# Patient Record
Sex: FEMALE | Race: AMERICAN INDIAN OR ALASKA NATIVE
[De-identification: names, ages, dates, MRNs, and addresses within clinical notes are randomized per-mention and may not be internally consistent; named-entity substitution may affect disease eponyms.]

---

## 2022-09-12 ENCOUNTER — HOSPITAL ENCOUNTER (EMERGENCY)
Dept: HOSPITAL 5 - ED | Age: 42
Discharge: LEFT BEFORE DISCHARGE | End: 2022-09-12
Payer: MEDICARE

## 2022-09-12 VITALS — DIASTOLIC BLOOD PRESSURE: 57 MMHG | SYSTOLIC BLOOD PRESSURE: 151 MMHG

## 2022-09-12 DIAGNOSIS — R07.89: Primary | ICD-10-CM

## 2022-09-12 DIAGNOSIS — Z53.21: ICD-10-CM

## 2022-09-12 DIAGNOSIS — M79.18: ICD-10-CM

## 2022-09-26 ENCOUNTER — HOSPITAL ENCOUNTER (INPATIENT)
Dept: HOSPITAL 5 - ED | Age: 42
LOS: 3 days | Discharge: HOME | DRG: 871 | End: 2022-09-29
Attending: STUDENT IN AN ORGANIZED HEALTH CARE EDUCATION/TRAINING PROGRAM | Admitting: INTERNAL MEDICINE
Payer: MEDICARE

## 2022-09-26 DIAGNOSIS — I21.A1: ICD-10-CM

## 2022-09-26 DIAGNOSIS — F17.200: ICD-10-CM

## 2022-09-26 DIAGNOSIS — E87.5: ICD-10-CM

## 2022-09-26 DIAGNOSIS — Z86.73: ICD-10-CM

## 2022-09-26 DIAGNOSIS — J15.6: ICD-10-CM

## 2022-09-26 DIAGNOSIS — J96.01: ICD-10-CM

## 2022-09-26 DIAGNOSIS — I25.10: ICD-10-CM

## 2022-09-26 DIAGNOSIS — Z88.8: ICD-10-CM

## 2022-09-26 DIAGNOSIS — N18.6: ICD-10-CM

## 2022-09-26 DIAGNOSIS — Z20.822: ICD-10-CM

## 2022-09-26 DIAGNOSIS — A41.9: Primary | ICD-10-CM

## 2022-09-26 DIAGNOSIS — I12.0: ICD-10-CM

## 2022-09-26 LAB
ALBUMIN SERPL-MCNC: 4.6 G/DL (ref 3.9–5)
ALT SERPL-CCNC: 24 UNITS/L (ref 7–56)
BASOPHILS # (AUTO): 0.2 K/MM3 (ref 0–0.1)
BASOPHILS NFR BLD AUTO: 1.4 % (ref 0–1.8)
BUN SERPL-MCNC: 58 MG/DL (ref 7–17)
BUN/CREAT SERPL: 6 %
CALCIUM SERPL-MCNC: 8.6 MG/DL (ref 8.4–10.2)
EOSINOPHIL # BLD AUTO: 0 K/MM3 (ref 0–0.4)
EOSINOPHIL NFR BLD AUTO: 0.1 % (ref 0–4.3)
HCT VFR BLD CALC: 30.7 % (ref 30.3–42.9)
HDLC SERPL-MCNC: 64 MG/DL (ref 40–59)
HEMOLYSIS INDEX: 7
HGB BLD-MCNC: 9.5 GM/DL (ref 10.1–14.3)
INR PPP: 0.95 (ref 0.87–1.13)
LYMPHOCYTES # BLD AUTO: 0.5 K/MM3 (ref 1.2–5.4)
LYMPHOCYTES NFR BLD AUTO: 3.7 % (ref 13.4–35)
MCHC RBC AUTO-ENTMCNC: 31 % (ref 30–34)
MCV RBC AUTO: 99 FL (ref 79–97)
MONOCYTES # (AUTO): 0.8 K/MM3 (ref 0–0.8)
MONOCYTES % (AUTO): 6 % (ref 0–7.3)
PLATELET # BLD: 339 K/MM3 (ref 140–440)
RBC # BLD AUTO: 3.1 M/MM3 (ref 3.65–5.03)

## 2022-09-26 PROCEDURE — 85610 PROTHROMBIN TIME: CPT

## 2022-09-26 PROCEDURE — 82962 GLUCOSE BLOOD TEST: CPT

## 2022-09-26 PROCEDURE — U0003 INFECTIOUS AGENT DETECTION BY NUCLEIC ACID (DNA OR RNA); SEVERE ACUTE RESPIRATORY SYNDROME CORONAVIRUS 2 (SARS-COV-2) (CORONAVIRUS DISEASE [COVID-19]), AMPLIFIED PROBE TECHNIQUE, MAKING USE OF HIGH THROUGHPUT TECHNOLOGIES AS DESCRIBED BY CMS-2020-01-R: HCPCS

## 2022-09-26 PROCEDURE — 80061 LIPID PANEL: CPT

## 2022-09-26 PROCEDURE — 85025 COMPLETE CBC W/AUTO DIFF WBC: CPT

## 2022-09-26 PROCEDURE — 82550 ASSAY OF CK (CPK): CPT

## 2022-09-26 PROCEDURE — 96365 THER/PROPH/DIAG IV INF INIT: CPT

## 2022-09-26 PROCEDURE — 84484 ASSAY OF TROPONIN QUANT: CPT

## 2022-09-26 PROCEDURE — 99285 EMERGENCY DEPT VISIT HI MDM: CPT

## 2022-09-26 PROCEDURE — 80074 ACUTE HEPATITIS PANEL: CPT

## 2022-09-26 PROCEDURE — 84132 ASSAY OF SERUM POTASSIUM: CPT

## 2022-09-26 PROCEDURE — C8929 TTE W OR WO FOL WCON,DOPPLER: HCPCS

## 2022-09-26 PROCEDURE — 80053 COMPREHEN METABOLIC PANEL: CPT

## 2022-09-26 PROCEDURE — 85007 BL SMEAR W/DIFF WBC COUNT: CPT

## 2022-09-26 PROCEDURE — 82553 CREATINE MB FRACTION: CPT

## 2022-09-26 PROCEDURE — 93005 ELECTROCARDIOGRAM TRACING: CPT

## 2022-09-26 PROCEDURE — 93306 TTE W/DOPPLER COMPLETE: CPT

## 2022-09-26 PROCEDURE — 83690 ASSAY OF LIPASE: CPT

## 2022-09-26 PROCEDURE — 83036 HEMOGLOBIN GLYCOSYLATED A1C: CPT

## 2022-09-26 PROCEDURE — 83880 ASSAY OF NATRIURETIC PEPTIDE: CPT

## 2022-09-26 PROCEDURE — 96366 THER/PROPH/DIAG IV INF ADDON: CPT

## 2022-09-26 PROCEDURE — 82010 KETONE BODYS QUAN: CPT

## 2022-09-26 PROCEDURE — 71045 X-RAY EXAM CHEST 1 VIEW: CPT

## 2022-09-26 PROCEDURE — 83735 ASSAY OF MAGNESIUM: CPT

## 2022-09-26 PROCEDURE — 36415 COLL VENOUS BLD VENIPUNCTURE: CPT

## 2022-09-26 RX ADMIN — MORPHINE SULFATE PRN MG: 2 INJECTION, SOLUTION INTRAMUSCULAR; INTRAVENOUS at 18:50

## 2022-09-26 RX ADMIN — HEPARIN SODIUM SCH UNIT: 5000 INJECTION, SOLUTION INTRAVENOUS; SUBCUTANEOUS at 21:50

## 2022-09-26 RX ADMIN — HEPARIN SODIUM SCH UNIT: 5000 INJECTION, SOLUTION INTRAVENOUS; SUBCUTANEOUS at 15:02

## 2022-09-26 RX ADMIN — MORPHINE SULFATE PRN MG: 2 INJECTION, SOLUTION INTRAMUSCULAR; INTRAVENOUS at 15:02

## 2022-09-26 RX ADMIN — ONDANSETRON PRN MG: 2 INJECTION INTRAMUSCULAR; INTRAVENOUS at 23:39

## 2022-09-26 RX ADMIN — HEPARIN SODIUM SCH: 5000 INJECTION, SOLUTION INTRAVENOUS; SUBCUTANEOUS at 21:55

## 2022-09-26 RX ADMIN — Medication SCH ML: at 15:01

## 2022-09-26 RX ADMIN — HEPARIN SODIUM SCH: 5000 INJECTION, SOLUTION INTRAVENOUS; SUBCUTANEOUS at 15:09

## 2022-09-26 RX ADMIN — MORPHINE SULFATE PRN MG: 2 INJECTION, SOLUTION INTRAMUSCULAR; INTRAVENOUS at 21:57

## 2022-09-26 RX ADMIN — Medication SCH ML: at 21:54

## 2022-09-26 NOTE — EMERGENCY DEPARTMENT REPORT
ED Chest Pain HPI





- General


Chief Complaint: Chest Pain


Stated Complaint: CHEST PAIN


PUI?: No


Time Seen by Provider: 09/26/22 08:07


Source: patient, EMS


Mode of arrival: Stretcher


Limitations: No Limitations





- History of Present Illness


Initial Comments: 





pt is 42 years old with CRF with dialysis MWF , here today for chest pain she 

said she was released from [facility] for pneumonia 


-: Gradual, days(s)


Onset: during rest


Pain Location: substernal


Pain Radiation: back


Severity scale (0 -10): 3


Consistency: intermittent


Improves With: nothing


Worsens With: nothing





- Related Data


                                    Allergies











Allergy/AdvReac Type Severity Reaction Status Date / Time


 


flunisolide [From Nasarel] Allergy  Unknown Verified 09/12/22 14:23


 


heparin Allergy  Unknown Verified 09/12/22 14:23


 


methocarbamol [From Robaxin] Allergy  Unknown Verified 09/12/22 14:23














Heart Score





- HEART Score


History: Slightly suspicious


EKG: Normal


Age: < 45


Risk factors: 1-2 risk factors


Troponin: 1-3x normal limit


HEART Score: 2





- EKG Read Time


Time EKG Completed: 10:37


EKG Read Time: 10:37





- Critical Actions


Critical Actions: 0-3 pts:0.9-1.7%risk of adverse cardiac event.Candidate for 

discharge





ED Review of Systems


ROS: 


Stated complaint: CHEST PAIN


Other details as noted in HPI





Constitutional: denies: chills, fever


Eyes: denies: eye pain, eye discharge, vision change


ENT: denies: ear pain, throat pain


Respiratory: denies: cough, shortness of breath, wheezing


Cardiovascular: denies: chest pain, palpitations


Endocrine: no symptoms reported


Gastrointestinal: denies: abdominal pain, nausea, diarrhea


Genitourinary: denies: urgency, dysuria, discharge


Musculoskeletal: denies: back pain, joint swelling, arthralgia


Skin: denies: rash, lesions


Neurological: denies: headache, weakness, paresthesias


Psychiatric: denies: anxiety, depression


Hematological/Lymphatic: denies: easy bleeding, easy bruising





ED Past Medical Hx





- Past Medical History


Hx Hypertension: Yes


Hx CVA: Yes


Hx Heart Attack/AMI: Yes


Hx Diabetes: Yes


Hx Renal Disease: Yes





- Social History


Smoking Status: Current Every Day Smoker





ED Physical Exam





- General


Limitations: No Limitations


General appearance: alert, in no apparent distress





- Head


Head exam: Present: atraumatic, normocephalic





- Eye


Eye exam: Present: normal appearance





- ENT


ENT exam: Present: mucous membranes moist





- Neck


Neck exam: Present: normal inspection





- Respiratory


Respiratory exam: Present: normal lung sounds bilaterally.  Absent: respiratory 

distress





- Cardiovascular


Cardiovascular Exam: Present: regular rate, normal rhythm.  Absent: systolic 

murmur, diastolic murmur, rubs, gallop





- GI/Abdominal


GI/Abdominal exam: Present: soft, normal bowel sounds





- Extremities Exam


Extremities exam: Present: normal inspection





- Back Exam


Back exam: Present: normal inspection





- Neurological Exam


Neurological exam: Present: alert, oriented X3





- Psychiatric


Psychiatric exam: Present: normal affect, normal mood





- Skin


Skin exam: Present: warm, dry, intact, normal color.  Absent: rash





ED Course





                                   Vital Signs











  09/26/22 09/26/22 09/26/22





  07:39 08:12 10:04


 


Temperature 97.9 F 98.5 F 


 


Pulse Rate 111 H 104 H 103 H


 


Respiratory 16 14 27 H





Rate   


 


Blood Pressure   


 


Blood Pressure 180/90 154/62 144/76





[Left]   


 


O2 Sat by Pulse 98 98 99





Oximetry   














  09/26/22





  10:18


 


Temperature 


 


Pulse Rate 104 H


 


Respiratory 26 H





Rate 


 


Blood Pressure 155/75


 


Blood Pressure 





[Left] 


 


O2 Sat by Pulse 99





Oximetry 














ED Medical Decision Making





- Lab Data


Result diagrams: 


                                 09/26/22 08:41





                                 09/26/22 08:41





- EKG Data


-: EKG Interpreted by Me


EKG shows normal: sinus rhythm


Rate: tachycardia





- Radiology Data


Radiology results: report reviewed, image reviewed





- Medical Decision Making





work up shwoed elevated wbc , x ray showed pneumoinia abx given cultures taken 

prior, will admit for pneumonia 


Critical care attestation.: 


If time is entered above; I have spent that time in minutes in the direct care 

of this critically ill patient, excluding procedure time.








ED Disposition


Clinical Impression: 


 Pneumonia, Chest pain, ESRD (end stage renal disease) on dialysis





Disposition: 09 ADMITTED AS INPATIENT


Is pt being admited?: Yes


Does the pt Need Aspirin: No


Condition: Stable


Instructions:  Bacterial Pneumonia (ED), Nonspecific Chest Pain, Adult


Referrals: 


PRIMARY CARE,MD [Primary Care Provider] - 3-5 Days

## 2022-09-26 NOTE — XRAY REPORT
CHEST 1 VIEW 9/26/2022 7:54 AM



INDICATION / CLINICAL INFORMATION: Dyspnea.



COMPARISON: 3/19/2010



FINDINGS:



SUPPORT DEVICES: None.



HEART / MEDIASTINUM: No significant abnormality. 



LUNGS / PLEURA: Moderate bilateral pulmonary opacities have developed which are most pronounced in th
e right lower lung. No significant pleural effusion or pneumothorax. 



ADDITIONAL FINDINGS: No significant additional findings.



IMPRESSION:

New bilateral lung opacities which are most pronounced in the right lower lobe. Correlate for pneumon
ia. Atypical pneumonia is not excluded.



Signer Name: Adrián Castrejon Jr, MD 

Signed: 9/26/2022 8:56 AM

Workstation Name: [id number]

## 2022-09-27 LAB
ALBUMIN SERPL-MCNC: 4 G/DL (ref 3.9–5)
ALT SERPL-CCNC: 15 UNITS/L (ref 7–56)
ANISOCYTOSIS BLD QL SMEAR: (no result)
BAND NEUTROPHILS # (MANUAL): 0 K/MM3
BUN SERPL-MCNC: 68 MG/DL (ref 7–17)
BUN/CREAT SERPL: 7 %
CALCIUM SERPL-MCNC: 9.1 MG/DL (ref 8.4–10.2)
HCT VFR BLD CALC: 29.7 % (ref 30.3–42.9)
HEMOLYSIS INDEX: 18
HGB BLD-MCNC: 9.3 GM/DL (ref 10.1–14.3)
MCHC RBC AUTO-ENTMCNC: 31 % (ref 30–34)
MCV RBC AUTO: 97 FL (ref 79–97)
MYELOCYTES # (MANUAL): 0 K/MM3
PLATELET # BLD: 322 K/MM3 (ref 140–440)
PROMYELOCYTES # (MANUAL): 0 K/MM3
RBC # BLD AUTO: 3.08 M/MM3 (ref 3.65–5.03)
TOTAL CELLS COUNTED BLD: 100

## 2022-09-27 PROCEDURE — 5A1D70Z PERFORMANCE OF URINARY FILTRATION, INTERMITTENT, LESS THAN 6 HOURS PER DAY: ICD-10-PCS | Performed by: INTERNAL MEDICINE

## 2022-09-27 RX ADMIN — CEFTRIAXONE SODIUM SCH MLS/HR: 2 INJECTION, POWDER, FOR SOLUTION INTRAMUSCULAR; INTRAVENOUS at 22:32

## 2022-09-27 RX ADMIN — ONDANSETRON PRN MG: 2 INJECTION INTRAMUSCULAR; INTRAVENOUS at 15:50

## 2022-09-27 RX ADMIN — HEPARIN SODIUM SCH: 5000 INJECTION, SOLUTION INTRAVENOUS; SUBCUTANEOUS at 23:01

## 2022-09-27 RX ADMIN — INSULIN LISPRO SCH: 100 INJECTION, SOLUTION INTRAVENOUS; SUBCUTANEOUS at 17:00

## 2022-09-27 RX ADMIN — AZITHROMYCIN SCH MLS/HR: 500 INJECTION, POWDER, LYOPHILIZED, FOR SOLUTION INTRAVENOUS at 15:25

## 2022-09-27 RX ADMIN — PANTOPRAZOLE SODIUM SCH MG: 40 TABLET, DELAYED RELEASE ORAL at 08:13

## 2022-09-27 RX ADMIN — Medication SCH ML: at 22:04

## 2022-09-27 RX ADMIN — MORPHINE SULFATE PRN MG: 2 INJECTION, SOLUTION INTRAMUSCULAR; INTRAVENOUS at 23:25

## 2022-09-27 RX ADMIN — INSULIN LISPRO SCH: 100 INJECTION, SOLUTION INTRAVENOUS; SUBCUTANEOUS at 14:34

## 2022-09-27 RX ADMIN — HEPARIN SODIUM SCH UNIT: 5000 INJECTION, SOLUTION INTRAVENOUS; SUBCUTANEOUS at 22:03

## 2022-09-27 RX ADMIN — ONDANSETRON PRN MG: 2 INJECTION INTRAMUSCULAR; INTRAVENOUS at 23:22

## 2022-09-27 RX ADMIN — INSULIN LISPRO SCH UNIT: 100 INJECTION, SOLUTION INTRAVENOUS; SUBCUTANEOUS at 22:04

## 2022-09-27 RX ADMIN — Medication SCH ML: at 15:30

## 2022-09-27 RX ADMIN — HEPARIN SODIUM SCH UNIT: 5000 INJECTION, SOLUTION INTRAVENOUS; SUBCUTANEOUS at 15:30

## 2022-09-27 RX ADMIN — MORPHINE SULFATE PRN MG: 2 INJECTION, SOLUTION INTRAMUSCULAR; INTRAVENOUS at 15:29
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
## 2022-09-27 NOTE — PROGRESS NOTE
Assessment and Plan


Assessment and plan: 





#Acute hypoxic respiratory failure


#Pneumonia


-CXR w/ RLL consolidation suspicious for pneumonia


-patient currently on 4L NC, no baseline O2 requirement at home; will wean as 

tolerated


-continue ceftriaxone and azithromycin


-COVID PCR ordered





#NSTEMI, likely type II


-troponin 0.127-> 0.166


-patient complains of excrutiating chest pain along with shortness of breath 

which caused presentation; currently chest pain free


-could be secondary to ESRD


-Cardiology consulted





#ESRD (end stage renal disease) on dialysis


#Hyperkalemia


-Access: LUE AVF


-Hyperkalemia treated medically


-will address in future with [facility] in dialysis


-Nephrology following, assistance appreciated


-continue hemodialysis while inpatient





#Coronary artery disease


-continue prasugrel, BB and ASA





#Hypertension


-continue coreg and imdur at home doses


-will adjust BP medications as needed





#Type II Diabetes Mellitus, ruled out


-A1C 5.6%, SSI discontinued





#Advanced care planning


-Disease education conducted, care plan discussed, diagnosis and prognosis discu

ssed.  Patient is full code.  Patient acknowledged understanding of care plan.  

+30 minutes.





History


Interval history: 





Patient seen during dialysis.  She complains of burning chest pain since 

yesterday and a sensation of being kicked in the back.  She reports compliance 

with hemodialysis on M/W/F schedule.  She does not currently have chest pain.  

She denies history of congestive heart failure, but is taking medications for 

"dilated cardiomyopathy".  She reports following with a cardiologist at 

[place].





Hospitalist Physical





- Physical exam


Narrative exam: 





GENERAL: Well-developed well-nourished. In no acute distress.


HEENT: NC in place at 4LPM. 


NECK: Supple.  


CHEST/LUNGS: Decreased breath sound in R lower lung field. Chest nonTTP


HEART/CARDIOVASCULAR: RRR. No murmur, rubs or gallops appreciated.


ABDOMEN: +BS. NT/ND.


SKIN: No rashes noted.


NEURO:  No focal motor deficit.  Follows all commands and is ambulatory.


MUSCULOSKELETAL: No joint effusion 


EXTREMITIES: No cyanosis, clubbing or edema. LUE AVF with thrill. 


PSYCH: Cooperative.





- Constitutional


Vitals: 


                                        











Temp Pulse Resp BP Pulse Ox


 


 99.8 F H  122 H  20   151/66   99 


 


 09/27/22 09:31  09/27/22 12:30  09/27/22 09:31  09/27/22 12:30  09/27/22 09:31














HEART Score





- HEART Score


EKG: Normal


Age: < 45


Risk factors: 1-2 risk factors


Troponin: 


                                        











Troponin T  0.166 ng/mL (0.00-0.029)  H* D 09/27/22  09:44    











Troponin: 1-3x normal limit





- Critical Actions


Critical Actions: 0-3 pts:0.9-1.7%risk of adverse cardiac event.Candidate for 

discharge





Results





- Labs


CBC & Chem 7: 


                                 09/27/22 05:27





                                 09/27/22 09:44


Labs: 


                             Laboratory Last Values











WBC  18.3 K/mm3 (4.5-11.0)  H  09/27/22  05:27    


 


RBC  3.08 M/mm3 (3.65-5.03)  L  09/27/22  05:27    


 


Hgb  9.3 gm/dl (10.1-14.3)  L  09/27/22  05:27    


 


Hct  29.7 % (30.3-42.9)  L  09/27/22  05:27    


 


MCV  97 fl (79-97)   09/27/22  05:27    


 


MCH  30 pg (28-32)   09/27/22  05:27    


 


MCHC  31 % (30-34)   09/27/22  05:27    


 


RDW  20.5 % (13.2-15.2)  H  09/27/22  05:27    


 


Plt Count  322 K/mm3 (140-440)   09/27/22  05:27    


 


Lymph % (Auto)  3.7 % (13.4-35.0)  L  09/26/22  08:41    


 


Mono % (Auto)  6.0 % (0.0-7.3)   09/26/22  08:41    


 


Eos % (Auto)  0.1 % (0.0-4.3)   09/26/22  08:41    


 


Baso % (Auto)  1.4 % (0.0-1.8)   09/26/22  08:41    


 


Lymph # (Auto)  0.5 K/mm3 (1.2-5.4)  L  09/26/22  08:41    


 


Mono # (Auto)  0.8 K/mm3 (0.0-0.8)   09/26/22  08:41    


 


Eos # (Auto)  0.0 K/mm3 (0.0-0.4)   09/26/22  08:41    


 


Baso # (Auto)  0.2 K/mm3 (0.0-0.1)  H  09/26/22  08:41    


 


Add Manual Diff  Complete   09/27/22  05:27    


 


Total Counted  100   09/27/22  05:27    


 


Seg Neutrophils %  Np   09/27/22  05:27    


 


Seg Neuts % (Manual)  91.0 % (40.0-70.0)  H  09/27/22  05:27    


 


Band Neutrophils %  0 %  09/27/22  05:27    


 


Lymphocytes % (Manual)  4.0 % (13.4-35.0)  L  09/27/22  05:27    


 


Reactive Lymphs % (Man)  0 %  09/27/22  05:27    


 


Monocytes % (Manual)  5.0 % (0.0-7.3)   09/27/22  05:27    


 


Eosinophils % (Manual)  0 % (0.0-4.3)   09/27/22  05:27    


 


Basophils % (Manual)  0 % (0.0-1.8)   09/27/22  05:27    


 


Metamyelocytes %  0 %  09/27/22  05:27    


 


Myelocytes %  0 %  09/27/22  05:27    


 


Promyelocytes %  0 %  09/27/22  05:27    


 


Blast Cells %  0 %  09/27/22  05:27    


 


Nucleated RBC %  Not Reportable   09/27/22  05:27    


 


Seg Neutrophils #  11.7 K/mm3 (1.8-7.7)  H  09/26/22  08:41    


 


Seg Neutrophils # Man  16.7 K/mm3 (1.8-7.7)  H  09/27/22  05:27    


 


Band Neutrophils #  0.0 K/mm3  09/27/22  05:27    


 


Lymphocytes # (Manual)  0.7 K/mm3 (1.2-5.4)  L  09/27/22  05:27    


 


Abs React Lymphs (Man)  0.0 K/mm3  09/27/22  05:27    


 


Monocytes # (Manual)  0.9 K/mm3 (0.0-0.8)  H  09/27/22  05:27    


 


Eosinophils # (Manual)  0.0 K/mm3 (0.0-0.4)   09/27/22  05:27    


 


Basophils # (Manual)  0.0 K/mm3 (0.0-0.1)   09/27/22  05:27    


 


Metamyelocytes #  0.0 K/mm3  09/27/22  05:27    


 


Myelocytes #  0.0 K/mm3  09/27/22  05:27    


 


Promyelocytes #  0.0 K/mm3  09/27/22  05:27    


 


Blast Cells #  0.0 K/mm3  09/27/22  05:27    


 


WBC Morphology  Not Reportable   09/27/22  05:27    


 


Hypersegmented Neuts  Not Reportable   09/27/22  05:27    


 


Hyposegmented Neuts  Not Reportable   09/27/22  05:27    


 


Hypogranular Neuts  Not Reportable   09/27/22  05:27    


 


Smudge Cells  Not Reportable   09/27/22  05:27    


 


Toxic Granulation  Not Reportable   09/27/22  05:27    


 


Toxic Vacuolation  Not Reportable   09/27/22  05:27    


 


Dohle Bodies  Not Reportable   09/27/22  05:27    


 


Pelger-Huet Anomaly  Not Reportable   09/27/22  05:27    


 


Winifred Rods  Not Reportable   09/27/22  05:27    


 


Platelet Estimate  Consistent w auto   09/27/22  05:27    


 


Clumped Platelets  Not Reportable   09/27/22  05:27    


 


Plt Clumps, EDTA  Not Reportable   09/27/22  05:27    


 


Large Platelets  Not Reportable   09/27/22  05:27    


 


Giant Platelets  Not Reportable   09/27/22  05:27    


 


Platelet Satelliting  Not Reportable   09/27/22  05:27    


 


Plt Morphology Comment  Not Reportable   09/27/22  05:27    


 


RBC Morphology  Not Reportable   09/27/22  05:27    


 


Dimorphic RBCs  Not Reportable   09/27/22  05:27    


 


Polychromasia  Not Reportable   09/27/22  05:27    


 


Hypochromasia  Not Reportable   09/27/22  05:27    


 


Poikilocytosis  Not Reportable   09/27/22  05:27    


 


Anisocytosis  1+   09/27/22  05:27    


 


Microcytosis  Not Reportable   09/27/22  05:27    


 


Macrocytosis  Few   09/27/22  05:27    


 


Spherocytes  Not Reportable   09/27/22  05:27    


 


Pappenheimer Bodies  Not Reportable   09/27/22  05:27    


 


Sickle Cells  Not Reportable   09/27/22  05:27    


 


Target Cells  Not Reportable   09/27/22  05:27    


 


Tear Drop Cells  Not Reportable   09/27/22  05:27    


 


Ovalocytes  Not Reportable   09/27/22  05:27    


 


Helmet Cells  Not Reportable   09/27/22  05:27    


 


[place] Bodies  Not Reportable   09/27/22  05:27    


 


Cabot Rings  Not Reportable   09/27/22  05:27    


 


[place] Cells  Not Reportable   09/27/22  05:27    


 


Bite Cells  Not Reportable   09/27/22  05:27    


 


Crenated Cell  Not Reportable   09/27/22  05:27    


 


Elliptocytes  Not Reportable   09/27/22  05:27    


 


Acanthocytes (Spur)  Not Reportable   09/27/22  05:27    


 


Rouleaux  Not Reportable   09/27/22  05:27    


 


Hemoglobin C Crystals  Not Reportable   09/27/22  05:27    


 


Schistocytes  Few   09/27/22  05:27    


 


Malaria parasites  Not Reportable   09/27/22  05:27    


 


Manuel Bodies  Not Reportable   09/27/22  05:27    


 


Hem Pathologist Commnt  No   09/27/22  05:27    


 


PT  14.0 Sec. (12.2-14.9)   09/26/22  08:41    


 


INR  0.95  (0.87-1.13)   09/26/22  08:41    


 


Sodium  134 mmol/L (137-145)  L  09/27/22  05:27    


 


Potassium  5.3 mmol/L (3.6-5.0)  H D 09/27/22  09:44    


 


Chloride  91.4 mmol/L ()  L  09/27/22  05:27    


 


Carbon Dioxide  19 mmol/L (22-30)  L  09/27/22  05:27    


 


Anion Gap  31 mmol/L  09/27/22  05:27    


 


BUN  68 mg/dL (7-17)  H  09/27/22  05:27    


 


Creatinine  10.4 mg/dL (0.6-1.2)  H  09/27/22  05:27    


 


Estimated GFR  5 ml/min  09/27/22  05:27    


 


BUN/Creatinine Ratio  7 %  09/27/22  05:27    


 


Glucose  202 mg/dL ()  H  09/27/22  05:27    


 


Ketones Quantitative  Negative  (Negative)   09/26/22  08:41    


 


Calcium  9.1 mg/dL (8.4-10.2)   09/27/22  05:27    


 


Magnesium  2.10 mg/dL (1.7-2.3)   09/26/22  08:41    


 


Total Bilirubin  0.50 mg/dL (0.1-1.2)   09/27/22  05:27    


 


AST  15 units/L (5-40)   09/27/22  05:27    


 


ALT  15 units/L (7-56)   09/27/22  05:27    


 


Alkaline Phosphatase  186 units/L ()  H  09/27/22  05:27    


 


Total Creatine Kinase  73 units/L ()   09/26/22  08:41    


 


CK-MB (CK-2)  2.5 ng/mL (0.0-4.0)   09/26/22  08:41    


 


CK-MB (CK-2) Rel Index  3.4  (0-4)   09/26/22  08:41    


 


Troponin T  0.166 ng/mL (0.00-0.029)  H* D 09/27/22  09:44    


 


NT-Pro-B Natriuret Pep  65130 pg/mL (0-450)  H  09/26/22  08:41    


 


Total Protein  6.9 g/dL (6.3-8.2)   09/27/22  05:27    


 


Albumin  4.0 g/dL (3.9-5)   09/27/22  05:27    


 


Albumin/Globulin Ratio  1.4 %  09/27/22  05:27    


 


Triglycerides  70 mg/dL (2-149)   09/26/22  08:41    


 


Cholesterol  143 mg/dL ()   09/26/22  08:41    


 


LDL Cholesterol Direct  67 mg/dL ()   09/26/22  08:41    


 


HDL Cholesterol  64 mg/dL (40-59)  H  09/26/22  08:41    


 


Cholesterol/HDL Ratio  2.23 %  09/26/22  08:41    


 


Lipase  19 units/L (13-60)   09/26/22  08:41    


 


Hepatitis A IgM Ab  Non-reactive  (NonReactive)   09/27/22  09:44    


 


Hep Bs Antigen  Non-reactive  (Negative)   09/27/22  09:44    


 


Hep B Core IgM Ab  Non-reactive  (NonReactive)   09/27/22  09:44    


 


Hepatitis C Antibody  Non-reactive  (NonReactive)   09/27/22  09:44    











Harvey/IV: 


                                        





Voiding Method                   Toilet











Active Medications





- Current Medications


Current Medications: 














Generic Name Dose Route Start Last Admin





  Trade Name Freq  PRN Reason Stop Dose Admin


 


Acetaminophen  650 mg  09/26/22 14:21  09/26/22 21:51





  Acetaminophen 325 Mg Tab  PO   650 mg





  Q4H PRN   Administration





  Pain MILD(1-3)/Fever >100.5/HA  


 


Carvedilol  6.25 mg  09/27/22 12:00 





  Carvedilol 6.25 Mg Tab  PO  





  BID [facility]  


 


Epoetin Melo-epbx  10,000 unit  09/27/22 08:00 





  Epoetin Melo-Epbx 10,000 Unit/1 Ml Vial  IV  





  CHEPE PRN  





  hemodialysis  


 


Heparin Sodium (Porcine)  5,000 unit  09/26/22 14:30  09/26/22 21:55





  Heparin 5,000 Unit/1 Ml Vial  SUB-Q   Not Given





  Q12HR SANJUANA  


 


Hydromorphone HCl  0.5 mg  09/26/22 14:21 





  Hydromorphone 0.5 Mg/0.5 Ml Inj  IV  





  Q3H PRN  





  Pain , Severe (7-10)  


 


Sodium Chloride  100 mls @ 999 mls/hr  09/27/22 08:00 





  Nacl 0.9%  IV  





  CHEPE PRN  





  Hypotension  


 


Ceftriaxone Sodium  2 gm in 100 mls @ 200 mls/hr  09/27/22 22:00 





  Rocephin/Ns 2 Gm/100 Ml  IV  





  Q24H [facility]  





  Protocol  


 


Azithromycin  500 mg in 250 mls @ 250 mls/hr  09/27/22 10:00 





  Zithromax/Ns  IV  





  Q24H [facility]  


 


Insulin Human Lispro  0 unit  09/27/22 11:30 





  Insulin Lispro 100 Unit/Ml  SUB-Q  





  ACHS [facility]  





  Protocol  


 


Isosorbide Mononitrate  60 mg  09/27/22 12:00 





  Isosorbide Mononitrate Er 60 Mg Tab  PO  





  QDAY [facility]  


 


Morphine Sulfate  2 mg  09/26/22 14:21  09/26/22 21:57





  Morphine 2 Mg/1 Ml Inj  IV   2 mg





  Q4H PRN   Administration





  Pain, Moderate (4-6)  


 


Nitroglycerin  0.4 mg  09/26/22 23:49  09/27/22 00:35





  Nitroglycerin 0.4 Mg Tab Subl  SL   0.4 mg





  .Q5MIN PRN   Administration





  Chest Pain  


 


Ondansetron HCl  4 mg  09/26/22 14:21  09/26/22 23:39





  Ondansetron 4 Mg/2 Ml Inj  IV   4 mg





  Q8H PRN   Administration





  Nausea And Vomiting  


 


Pantoprazole Sodium  40 mg  09/27/22 07:30  09/27/22 08:13





  Pantoprazole 40 Mg Tab  PO   40 mg





  QDAC SANJUANA   Administration


 


Sodium Chloride  10 ml  09/26/22 15:00  09/26/22 21:54





  Sodium Chloride 0.9% 10 Ml Flush Syringe  IV   10 ml





  BID SANJUANA   Administration


 


Sodium Chloride  10 ml  09/26/22 14:21 





  Sodium Chloride 0.9% 10 Ml Flush Syringe  IV  





  PRN PRN  





  LINE FLUSH

## 2022-09-27 NOTE — HISTORY AND PHYSICAL REPORT
History of Present Illness


Date of examination: 09/26/22


Date of admission: 


09/26/22 14:21





Chief complaint: 


Increasing shortness of breath for 1 day





History of present illness: 


42-year-old female with history of end-stage renal disease and recent pneumonia 

comes in for increasing shortness of breath.  Patient undergoes hemodialysis on 

Monday Wednesday Friday.  No fever or chills.  Does not know who her 

nephrologist is.  Orthopnea present.  Shortness of breath on minimal exertion 

present.








- Past Medical History 


--Hypertension: Yes


--CVA: Yes


--Heart Attack/AMI: Yes


--Diabetes: Yes


--Renal Disease: Yes 





-past surgical history


--AV fistula





- Social History


--Smoking Status: Current Every Day Smoker   





- Family history


--Htn








Review of Systems


ROS: 


Stated complaint: CHEST PAIN


Other details as noted in HPI





Constitutional: denies: chills, fever


Eyes: denies: eye pain, eye discharge, vision change


ENT: denies: ear pain, throat pain


Respiratory: denies: cough, shortness of breath, wheezing


Cardiovascular: denies: chest pain, palpitations


Endocrine: no symptoms reported


Gastrointestinal: denies: abdominal pain, nausea, diarrhea


Genitourinary: denies: urgency, dysuria, discharge


Musculoskeletal: denies: back pain, joint swelling, arthralgia


Skin: denies: rash, lesions


Neurological: denies: headache, weakness, paresthesias


Psychiatric: denies: anxiety, depression


Hematological/Lymphatic: denies: easy bleeding, easy bruising

















Medications and Allergies


                                    Allergies











Allergy/AdvReac Type Severity Reaction Status Date / Time


 


flunisolide [From Nasarel] Allergy  Unknown Verified 09/12/22 14:23


 


heparin Allergy  Unknown Verified 09/12/22 14:23


 


methocarbamol [From Robaxin] Allergy  Unknown Verified 09/12/22 14:23











Active Meds: 


Active Medications





Acetaminophen (Acetaminophen 325 Mg Tab)  650 mg PO Q4H PRN


   PRN Reason: Pain MILD(1-3)/Fever >100.5/HA


   Last Admin: 09/26/22 21:51 Dose:  650 mg


   


Dextrose (Dextrose 50% In Water (25gm) 50 Ml Syringe)  50 ml IV ONCE ONE; 

Protocol


   Stop: 09/27/22 07:02


Heparin Sodium (Porcine) (Heparin 5,000 Unit/1 Ml Vial)  5,000 unit SUB-Q Q12HR 

SANJUANA


   Last Admin: 09/26/22 21:55 Dose:  Not Given


   


Hydromorphone HCl (Hydromorphone 0.5 Mg/0.5 Ml Inj)  0.5 mg IV Q3H PRN


   PRN Reason: Pain , Severe (7-10)


Insulin Human Regular (Insulin Regular, Human 100 Units/1 Ml)  10 units SUB-Q 

ONCE NR


   Stop: 09/27/22 09:00


Morphine Sulfate (Morphine 2 Mg/1 Ml Inj)  2 mg IV Q4H PRN


   PRN Reason: Pain, Moderate (4-6)


   Last Admin: 09/26/22 21:57 Dose:  2 mg


   


Nitroglycerin (Nitroglycerin 0.4 Mg Tab Subl)  0.4 mg SL .Q5MIN PRN


   PRN Reason: Chest Pain


   Last Admin: 09/27/22 00:35 Dose:  0.4 mg


   


Ondansetron HCl (Ondansetron 4 Mg/2 Ml Inj)  4 mg IV Q8H PRN


   PRN Reason: Nausea And Vomiting


   Last Admin: 09/26/22 23:39 Dose:  4 mg


   


Pantoprazole Sodium (Pantoprazole 40 Mg Tab)  40 mg PO QDAC SANJUANA


Sodium Chloride (Sodium Chloride 0.9% 10 Ml Flush Syringe)  10 ml IV BID [facility]


   Last Admin: 09/26/22 21:54 Dose:  10 ml


   


Sodium Chloride (Sodium Chloride 0.9% 10 Ml Flush Syringe)  10 ml IV PRN PRN


   PRN Reason: LINE FLUSH


Sodium Polystyrene Sulfonate (Sodium Polystyrene 15 Gm/60 Ml Oral Liqd)  15 gm 

PO ONCE NR


   Stop: 09/27/22 10:00











Exam





- Constitutional


Vitals: 


                                        











Temp Pulse Resp BP Pulse Ox


 


 98.3 F   108 H  18   167/82   100 


 


 09/27/22 05:03  09/27/22 05:03  09/27/22 05:03  09/27/22 05:03  09/27/22 05:03











General appearance: Present: mild distress, well-nourished





- EENT


Eyes: Present: PERRL


ENT: hearing intact, clear oral mucosa





- Neck


Neck: Present: supple, normal ROM





- Respiratory


Respiratory effort: normal


Respiratory: bilateral: CTA, rales (Scattered)





- Cardiovascular


Heart rate: 98


Rhythm: regular


Heart Sounds: Present: S1 & S2.  Absent: rub, click





- Extremities


Extremities: pulses symmetrical, No edema


Peripheral Pulses: within normal limits





- Abdominal


General gastrointestinal: Present: soft, non-tender, non-distended, normal bowel

sounds


Female genitourinary: Present: normal





- Integumentary


Integumentary: Present: clear, warm, dry





- Musculoskeletal


Musculoskeletal: gait normal, strength equal bilaterally





- Psychiatric


Psychiatric: appropriate mood/affect, intact judgment & insight





- Neurologic


Neurologic: CNII-XII intact, moves all extremities





HEART Score





- HEART Score


EKG: Normal


Age: < 45


Risk factors: 1-2 risk factors


Troponin: 


                                        











Troponin T  0.127 ng/mL (0.00-0.029)  H*  09/26/22  08:41    











Troponin: 1-3x normal limit





- Critical Actions


Critical Actions: 0-3 pts:0.9-1.7%risk of adverse cardiac event.Candidate for 

discharge





Results





- Labs


CBC & Chem 7: 


                                 09/27/22 05:27





                                 09/27/22 05:27


Labs: 


                             Laboratory Last Values











WBC  18.3 K/mm3 (4.5-11.0)  H  09/27/22  05:27    


 


RBC  3.08 M/mm3 (3.65-5.03)  L  09/27/22  05:27    


 


Hgb  9.3 gm/dl (10.1-14.3)  L  09/27/22  05:27    


 


Hct  29.7 % (30.3-42.9)  L  09/27/22  05:27    


 


MCV  97 fl (79-97)   09/27/22  05:27    


 


MCH  30 pg (28-32)   09/27/22  05:27    


 


MCHC  31 % (30-34)   09/27/22  05:27    


 


RDW  20.5 % (13.2-15.2)  H  09/27/22  05:27    


 


Plt Count  322 K/mm3 (140-440)   09/27/22  05:27    


 


Lymph % (Auto)  3.7 % (13.4-35.0)  L  09/26/22  08:41    


 


Mono % (Auto)  6.0 % (0.0-7.3)   09/26/22  08:41    


 


Eos % (Auto)  0.1 % (0.0-4.3)   09/26/22  08:41    


 


Baso % (Auto)  1.4 % (0.0-1.8)   09/26/22  08:41    


 


Lymph # (Auto)  0.5 K/mm3 (1.2-5.4)  L  09/26/22  08:41    


 


Mono # (Auto)  0.8 K/mm3 (0.0-0.8)   09/26/22  08:41    


 


Eos # (Auto)  0.0 K/mm3 (0.0-0.4)   09/26/22  08:41    


 


Baso # (Auto)  0.2 K/mm3 (0.0-0.1)  H  09/26/22  08:41    


 


Add Manual Diff  Complete   09/27/22  05:27    


 


Total Counted  100   09/27/22  05:27    


 


Seg Neutrophils %  Np   09/27/22  05:27    


 


Seg Neuts % (Manual)  91.0 % (40.0-70.0)  H  09/27/22  05:27    


 


Band Neutrophils %  0 %  09/27/22  05:27    


 


Lymphocytes % (Manual)  4.0 % (13.4-35.0)  L  09/27/22  05:27    


 


Reactive Lymphs % (Man)  0 %  09/27/22  05:27    


 


Monocytes % (Manual)  5.0 % (0.0-7.3)   09/27/22  05:27    


 


Eosinophils % (Manual)  0 % (0.0-4.3)   09/27/22  05:27    


 


Basophils % (Manual)  0 % (0.0-1.8)   09/27/22  05:27    


 


Metamyelocytes %  0 %  09/27/22  05:27    


 


Myelocytes %  0 %  09/27/22  05:27    


 


Promyelocytes %  0 %  09/27/22  05:27    


 


Blast Cells %  0 %  09/27/22  05:27    


 


Nucleated RBC %  Not Reportable   09/27/22  05:27    


 


Seg Neutrophils #  11.7 K/mm3 (1.8-7.7)  H  09/26/22  08:41    


 


Seg Neutrophils # Man  16.7 K/mm3 (1.8-7.7)  H  09/27/22  05:27    


 


Band Neutrophils #  0.0 K/mm3  09/27/22  05:27    


 


Lymphocytes # (Manual)  0.7 K/mm3 (1.2-5.4)  L  09/27/22  05:27    


 


Abs React Lymphs (Man)  0.0 K/mm3  09/27/22  05:27    


 


Monocytes # (Manual)  0.9 K/mm3 (0.0-0.8)  H  09/27/22  05:27    


 


Eosinophils # (Manual)  0.0 K/mm3 (0.0-0.4)   09/27/22  05:27    


 


Basophils # (Manual)  0.0 K/mm3 (0.0-0.1)   09/27/22  05:27    


 


Metamyelocytes #  0.0 K/mm3  09/27/22  05:27    


 


Myelocytes #  0.0 K/mm3  09/27/22  05:27    


 


Promyelocytes #  0.0 K/mm3  09/27/22  05:27    


 


Blast Cells #  0.0 K/mm3  09/27/22  05:27    


 


WBC Morphology  Not Reportable   09/27/22  05:27    


 


Hypersegmented Neuts  Not Reportable   09/27/22  05:27    


 


Hyposegmented Neuts  Not Reportable   09/27/22  05:27    


 


Hypogranular Neuts  Not Reportable   09/27/22  05:27    


 


Smudge Cells  Not Reportable   09/27/22  05:27    


 


Toxic Granulation  Not Reportable   09/27/22  05:27    


 


Toxic Vacuolation  Not Reportable   09/27/22  05:27    


 


Dohle Bodies  Not Reportable   09/27/22  05:27    


 


Pelger-Huet Anomaly  Not Reportable   09/27/22  05:27    


 


Winifred Rods  Not Reportable   09/27/22  05:27    


 


Platelet Estimate  Consistent w auto   09/27/22  05:27    


 


Clumped Platelets  Not Reportable   09/27/22  05:27    


 


Plt Clumps, EDTA  Not Reportable   09/27/22  05:27    


 


Large Platelets  Not Reportable   09/27/22  05:27    


 


Giant Platelets  Not Reportable   09/27/22  05:27    


 


Platelet Satelliting  Not Reportable   09/27/22  05:27    


 


Plt Morphology Comment  Not Reportable   09/27/22  05:27    


 


RBC Morphology  Not Reportable   09/27/22  05:27    


 


Dimorphic RBCs  Not Reportable   09/27/22  05:27    


 


Polychromasia  Not Reportable   09/27/22  05:27    


 


Hypochromasia  Not Reportable   09/27/22  05:27    


 


Poikilocytosis  Not Reportable   09/27/22  05:27    


 


Anisocytosis  1+   09/27/22  05:27    


 


Microcytosis  Not Reportable   09/27/22  05:27    


 


Macrocytosis  Few   09/27/22  05:27    


 


Spherocytes  Not Reportable   09/27/22  05:27    


 


Pappenheimer Bodies  Not Reportable   09/27/22  05:27    


 


Sickle Cells  Not Reportable   09/27/22  05:27    


 


Target Cells  Not Reportable   09/27/22  05:27    


 


Tear Drop Cells  Not Reportable   09/27/22  05:27    


 


Ovalocytes  Not Reportable   09/27/22  05:27    


 


Helmet Cells  Not Reportable   09/27/22  05:27    


 


Mantilla-Kaufman Bodies  Not Reportable   09/27/22  05:27    


 


Cabot Rings  Not Reportable   09/27/22  05:27    


 


Radha Cells  Not Reportable   09/27/22  05:27    


 


Bite Cells  Not Reportable   09/27/22  05:27    


 


Crenated Cell  Not Reportable   09/27/22  05:27    


 


Elliptocytes  Not Reportable   09/27/22  05:27    


 


Acanthocytes (Spur)  Not Reportable   09/27/22  05:27    


 


Rouleaux  Not Reportable   09/27/22  05:27    


 


Hemoglobin C Crystals  Not Reportable   09/27/22  05:27    


 


Schistocytes  Few   09/27/22  05:27    


 


Malaria parasites  Not Reportable   09/27/22  05:27    


 


Manuel Bodies  Not Reportable   09/27/22  05:27    


 


Hem Pathologist Commnt  No   09/27/22  05:27    


 


PT  14.0 Sec. (12.2-14.9)   09/26/22  08:41    


 


INR  0.95  (0.87-1.13)   09/26/22  08:41    


 


Sodium  134 mmol/L (137-145)  L  09/27/22  05:27    


 


Potassium  7.2 mmol/L (3.6-5.0)  H*  09/27/22  05:27    


 


Chloride  91.4 mmol/L ()  L  09/27/22  05:27    


 


Carbon Dioxide  19 mmol/L (22-30)  L  09/27/22  05:27    


 


Anion Gap  31 mmol/L  09/27/22  05:27    


 


BUN  68 mg/dL (7-17)  H  09/27/22  05:27    


 


Creatinine  10.4 mg/dL (0.6-1.2)  H  09/27/22  05:27    


 


Estimated GFR  5 ml/min  09/27/22  05:27    


 


BUN/Creatinine Ratio  7 %  09/27/22  05:27    


 


Glucose  202 mg/dL ()  H  09/27/22  05:27    


 


Ketones Quantitative  Negative  (Negative)   09/26/22  08:41    


 


Calcium  9.1 mg/dL (8.4-10.2)   09/27/22  05:27    


 


Magnesium  2.10 mg/dL (1.7-2.3)   09/26/22  08:41    


 


Total Bilirubin  0.50 mg/dL (0.1-1.2)   09/27/22  05:27    


 


AST  15 units/L (5-40)   09/27/22  05:27    


 


ALT  15 units/L (7-56)   09/27/22  05:27    


 


Alkaline Phosphatase  186 units/L ()  H  09/27/22  05:27    


 


Total Creatine Kinase  73 units/L ()   09/26/22  08:41    


 


CK-MB (CK-2)  2.5 ng/mL (0.0-4.0)   09/26/22  08:41    


 


CK-MB (CK-2) Rel Index  3.4  (0-4)   09/26/22  08:41    


 


Troponin T  0.127 ng/mL (0.00-0.029)  H*  09/26/22  08:41    


 


NT-Pro-B Natriuret Pep  02756 pg/mL (0-450)  H  09/26/22  08:41    


 


Total Protein  6.9 g/dL (6.3-8.2)   09/27/22  05:27    


 


Albumin  4.0 g/dL (3.9-5)   09/27/22  05:27    


 


Albumin/Globulin Ratio  1.4 %  09/27/22  05:27    


 


Triglycerides  70 mg/dL (2-149)   09/26/22  08:41    


 


Cholesterol  143 mg/dL ()   09/26/22  08:41    


 


LDL Cholesterol Direct  67 mg/dL ()   09/26/22  08:41    


 


HDL Cholesterol  64 mg/dL (40-59)  H  09/26/22  08:41    


 


Cholesterol/HDL Ratio  2.23 %  09/26/22  08:41    


 


Lipase  19 units/L (13-60)   09/26/22  08:41    








                                    Short CBC











  09/26/22 09/27/22 Range/Units





  08:41 05:27 


 


WBC  13.1 H  18.3 H  (4.5-11.0)  K/mm3


 


Hgb  9.5 L  9.3 L  (10.1-14.3)  gm/dl


 


Hct  30.7  29.7 L  (30.3-42.9)  %


 


Plt Count  339  322  (140-440)  K/mm3








                                       BMP











  09/26/22 09/27/22





  08:41 05:27


 


Sodium  140  134 L


 


Potassium  6.0 H  7.2 H*


 


Chloride  95.1 L  91.4 L


 


Carbon Dioxide  22  19 L


 


BUN  58 H  68 H


 


Creatinine  9.6 H  10.4 H


 


Glucose  224 H  202 H


 


Calcium  8.6  9.1








                                 Cardiac Enzymes











  09/26/22 Range/Units





  08:41 


 


Total Creatine Kinase  73  ()  units/L


 


CK-MB (CK-2)  2.5  (0.0-4.0)  ng/mL


 


Troponin T  0.127 H*  (0.00-0.029)  ng/mL








                                 Liver Function











  09/26/22 09/27/22 Range/Units





  08:41 05:27 


 


Total Bilirubin  0.40  0.50  (0.1-1.2)  mg/dL


 


AST  30  15  (5-40)  units/L


 


ALT  24  15  (7-56)  units/L


 


Alkaline Phosphatase  251 H  186 H  ()  units/L


 


Albumin  4.6  4.0  (3.9-5)  g/dL











Harvey/IV: 


                                        





Voiding Method                   Toilet











Assessment and Plan


Advance Directives: Yes (Full code)


VTE prophylaxis?: Chemical


Plan of care discussed with patient/family: Yes





- Patient Problems


(1) Hyperkalemia


Current Visit: Yes   Status: Acute   


Plan to address problem: 


Hyperkalemia treated








(2) Pneumonia


Current Visit: Yes   Status: Acute   


Qualifiers: 


   Pneumonia type: due to Haemophilus influenzae   Lung location: unspecified 

part of lung 


Plan to address problem: 


Patient initiated on ceftriaxone and azithromycin








(3) Volume overload


Current Visit: Yes   Status: Acute   


Plan to address problem: 


Secondary to end-stage renal disease


Emergent hemodialysis


Nephrology consulted








(4) ESRD (end stage renal disease) on dialysis


Current Visit: Yes   Status: Chronic   


Plan to address problem: 


Continue hemodialysis








(5) HTN (hypertension)


Current Visit: Yes   Status: Chronic   


Qualifiers: 


   Hypertension type: primary hypertension   Qualified Code(s): I10 - Essential 

(primary) hypertension   


Plan to address problem: 


Continue antihypertensives and adjust medications as necessary








(6) T2DM (type 2 diabetes mellitus)


Current Visit: Yes   Status: Chronic   


Qualifiers: 


   Diabetes mellitus long term insulin use: unspecified long term insulin use 

status 


Plan to address problem: 


Coverage for now








(7) DVT prophylaxis


Current Visit: Yes   Status: Acute   


Plan to address problem: 


On heparin and GI prophylaxis








(8) Advance care planning


Current Visit: Yes   Status: Acute   


Plan to address problem: 


Disease education conducted, care plan discussed, diagnosis and prognosis 

discussed.  Patient is full code.  Patient acknowledged understanding of care 

plan.  +30 minutes.

## 2022-09-27 NOTE — CONSULTATION
History of Present Illness





- Reason for Consult


end stage renal disease, hyperkalemia





- History of Present Illness





Very pleasant 42-year-old -American female with a past medical history of

end-stage renal disease secondary to diabetes and hypertension, with a 

functional left upper extremity AV fistula, who has been on dialysis for the 

past 7 years, under the care of Dr. Degroot, presented to emergency room 

department secondary to shortness of breath.  Concerns at this time for possible

underlying pneumonia.  Nephrology consulted for urgent dialysis needs.  

Admission labs were concerning for worsening hyperkalemia.  Patient typically is

on a Monday/Wednesday/Friday hemodialysis schedule.





Past History


Past Medical History: diabetes, ESRD, hypertension, stroke


Past Surgical History: Other (AV fistula creation)


Social history: no significant social history


Family history: no significant family history





Medications and Allergies


                                    Allergies











Allergy/AdvReac Type Severity Reaction Status Date / Time


 


flunisolide [From Nasarel] Allergy  Unknown Verified 09/12/22 14:23


 


heparin Allergy  Unknown Verified 09/12/22 14:23


 


methocarbamol [From Robaxin] Allergy  Unknown Verified 09/12/22 14:23











Active Meds: 


Active Medications





Acetaminophen (Acetaminophen 325 Mg Tab)  650 mg PO Q4H PRN


   PRN Reason: Pain MILD(1-3)/Fever >100.5/HA


   Last Admin: 09/26/22 21:51 Dose:  650 mg


   


Carvedilol (Carvedilol 6.25 Mg Tab)  6.25 mg PO BID SANJUANA


Epoetin Melo-epbx (Epoetin Melo-Epbx 10,000 Unit/1 Ml Vial)  10,000 unit IV CHEPE 

PRN


   PRN Reason: hemodialysis


Heparin Sodium (Porcine) (Heparin 5,000 Unit/1 Ml Vial)  5,000 unit SUB-Q Q12HR 

SANJUANA


   Last Admin: 09/26/22 21:55 Dose:  Not Given


   


Hydromorphone HCl (Hydromorphone 0.5 Mg/0.5 Ml Inj)  0.5 mg IV Q3H PRN


   PRN Reason: Pain , Severe (7-10)


Sodium Chloride (Nacl 0.9%)  100 mls @ 999 mls/hr IV CHEPE PRN


   PRN Reason: Hypotension


Ceftriaxone Sodium (Rocephin/Ns 2 Gm/100 Ml)  2 gm in 100 mls @ 200 mls/hr IV 

Q24H SANJUANA; Protocol


Azithromycin (Zithromax/Ns)  500 mg in 250 mls @ 250 mls/hr IV Q24H SANJUANA


Insulin Human Lispro (Insulin Lispro 100 Unit/Ml)  0 unit SUB-Q ACHS SANJUANA; Pro

tocol


Isosorbide Mononitrate (Isosorbide Mononitrate Er 60 Mg Tab)  60 mg PO QDAY SANJUANA


Morphine Sulfate (Morphine 2 Mg/1 Ml Inj)  2 mg IV Q4H PRN


   PRN Reason: Pain, Moderate (4-6)


   Last Admin: 09/26/22 21:57 Dose:  2 mg


   


Nitroglycerin (Nitroglycerin 0.4 Mg Tab Subl)  0.4 mg SL .Q5MIN PRN


   PRN Reason: Chest Pain


   Last Admin: 09/27/22 00:35 Dose:  0.4 mg


   


Ondansetron HCl (Ondansetron 4 Mg/2 Ml Inj)  4 mg IV Q8H PRN


   PRN Reason: Nausea And Vomiting


   Last Admin: 09/26/22 23:39 Dose:  4 mg


   


Pantoprazole Sodium (Pantoprazole 40 Mg Tab)  40 mg PO QDAC [facility]


   Last Admin: 09/27/22 08:13 Dose:  40 mg


   


Sodium Chloride (Sodium Chloride 0.9% 10 Ml Flush Syringe)  10 ml IV BID [facility]


   Last Admin: 09/26/22 21:54 Dose:  10 ml


   


Sodium Chloride (Sodium Chloride 0.9% 10 Ml Flush Syringe)  10 ml IV PRN PRN


   PRN Reason: LINE FLUSH











Review of Systems


Constitutional: fatigue, weakness


Respiratory: cough, shortness of breath





Exam





- Vital Signs


Vital signs: 


                                   Vital Signs











Temp Pulse Resp BP Pulse Ox


 


 97.9 F   111 H  16   180/90   98 


 


 09/26/22 07:39  09/26/22 07:39  09/26/22 07:39  09/26/22 07:39  09/26/22 07:39














- General Appearance


General appearance: well-developed, appears stated age


EENT: ATNC


Neck: Present: neck supple


Respiratory: Decreased Breath Sounds


Heart: regular


Gastrointestinal: Present: normal


Integumentary: no rash


Neurologic: no focal deficit, alert and oriented x3


Musculoskeletal: Present: deferred


Psychiatric: cooperative





Results





- Lab Results





                                 09/27/22 05:27





                                 09/27/22 09:44


                             Most recent lab results











Calcium  9.1 mg/dL (8.4-10.2)   09/27/22  05:27    


 


Magnesium  2.10 mg/dL (1.7-2.3)   09/26/22  08:41    














Assessment and Plan





- Patient Problems


(1) Hyperkalemia


Current Visit: Yes   Status: Acute   


Plan to address problem: 


Will correct with hemodialysis.  Please ensure that patient is on a low 

potassium diet.








(2) Pneumonia


Current Visit: Yes   Status: Acute   


Qualifiers: 


   Pneumonia type: due to Haemophilus influenzae   Lung location: unspecified 

part of lung 


Plan to address problem: 


Please ensure that antibiotics are dosed appropriately for her decreased renal 

function.








(3) Volume overload


Current Visit: Yes   Status: Chronic   


Plan to address problem: 


Optimize volume management with ultrafiltration during dialysis.








(4) ESRD (end stage renal disease) on dialysis


Current Visit: Yes   Status: Chronic   


Plan to address problem: 


Orders placed for urgent dialysis today.  I have instructed the dialysis nursing

staff to then switch her back to her Monday/Wednesday/Friday schedule for 

further inpatient dialysis needs.  We will assess daily for needs of extra 

treatments based on her volume status and if she has recurrent issues with 

hyperkalemia.








(5) HTN (hypertension)


Current Visit: Yes   Status: Chronic   


Qualifiers: 


   Hypertension type: primary hypertension   Qualified Code(s): I10 - Essential 

(primary) hypertension   


Plan to address problem: 


Monitor blood pressures under current regimen.








(6) T2DM (type 2 diabetes mellitus)


Current Visit: Yes   Status: Chronic   


Qualifiers: 


   Diabetes mellitus long term insulin use: unspecified long term insulin use 

status 


Plan to address problem: 


Diabetes management per primary attending.

## 2022-09-28 PROCEDURE — 5A1D70Z PERFORMANCE OF URINARY FILTRATION, INTERMITTENT, LESS THAN 6 HOURS PER DAY: ICD-10-PCS | Performed by: INTERNAL MEDICINE

## 2022-09-28 RX ADMIN — ASPIRIN SCH MG: 81 TABLET, COATED ORAL at 14:59

## 2022-09-28 RX ADMIN — HEPARIN SODIUM SCH: 5000 INJECTION, SOLUTION INTRAVENOUS; SUBCUTANEOUS at 21:19

## 2022-09-28 RX ADMIN — Medication SCH ML: at 21:22

## 2022-09-28 RX ADMIN — Medication SCH ML: at 15:00

## 2022-09-28 RX ADMIN — PANTOPRAZOLE SODIUM SCH MG: 40 TABLET, DELAYED RELEASE ORAL at 08:00

## 2022-09-28 RX ADMIN — CEFTRIAXONE SODIUM SCH MLS/HR: 2 INJECTION, POWDER, FOR SOLUTION INTRAMUSCULAR; INTRAVENOUS at 21:18

## 2022-09-28 RX ADMIN — HEPARIN SODIUM SCH UNIT: 5000 INJECTION, SOLUTION INTRAVENOUS; SUBCUTANEOUS at 14:59

## 2022-09-28 RX ADMIN — INSULIN LISPRO SCH UNIT: 100 INJECTION, SOLUTION INTRAVENOUS; SUBCUTANEOUS at 08:00

## 2022-09-28 RX ADMIN — MORPHINE SULFATE PRN MG: 2 INJECTION, SOLUTION INTRAMUSCULAR; INTRAVENOUS at 22:18

## 2022-09-28 RX ADMIN — PRASUGREL SCH MG: 10 TABLET, FILM COATED ORAL at 17:28

## 2022-09-28 RX ADMIN — INSULIN LISPRO SCH: 100 INJECTION, SOLUTION INTRAVENOUS; SUBCUTANEOUS at 12:00

## 2022-09-28 RX ADMIN — INSULIN LISPRO SCH UNIT: 100 INJECTION, SOLUTION INTRAVENOUS; SUBCUTANEOUS at 21:19

## 2022-09-28 RX ADMIN — AZITHROMYCIN SCH MLS/HR: 500 INJECTION, POWDER, LYOPHILIZED, FOR SOLUTION INTRAVENOUS at 15:00

## 2022-09-28 NOTE — PROGRESS NOTE
Assessment and Plan


Assessment and plan: 





#Acute hypoxic respiratory failure-improving


#Pneumonia


-CXR w/ RLL consolidation suspicious for pneumonia


-patient currently on 4L NC, no baseline O2 requirement at home; will wean as 

tolerated


-continue ceftriaxone and azithromycin


-COVID PCR ordered


-home O2 walk test prior to discharge 





#NSTEMI, likely type II


-troponin 0.127-> 0.166


-patient complains of excrutiating chest pain along with shortness of breath 

which caused presentation; currently chest pain free


-could be secondary to ESRD


-TTE shows normal EF


-Cardiology consulted





#ESRD (end stage renal disease) on dialysis


#Hyperkalemia


-Access: LUE AVF


-Hyperkalemia treated medically


-will address in future with K bath in dialysis


-Nephrology following, assistance appreciated


-continue hemodialysis while inpatient





#Coronary artery disease


-continue prasugrel, BB and ASA





#Hypertension


-continue coreg and imdur at home doses


-will adjust BP medications as needed





#Type II Diabetes Mellitus, ruled out


-A1C 5.6%, SSI discontinued





#Advanced care planning


-Disease education conducted, care plan discussed, diagnosis and prognosis 

discussed.  Patient is full code.  Patient acknowledged understanding of care 

plan.  +30 minutes.





History


Interval history: 





No acute events overnight. Patient reports improvement in shortness of breath. 

She has been ambulating with supplemental O2. We discussed plan to wean O2 prior

to discharge. 





Hospitalist Physical





- Physical exam


Narrative exam: 





GENERAL: Well-developed well-nourished. In no acute distress.


HEENT: NC in place at 4LPM. 


NECK: Supple.  


CHEST/LUNGS: Decreased breath sound in R lower lung field. Chest nonTTP


HEART/CARDIOVASCULAR: RRR. No murmur, rubs or gallops appreciated.


ABDOMEN: +BS. NT/ND.


SKIN: No rashes noted.


NEURO:  No focal motor deficit.  Follows all commands and is ambulatory.


MUSCULOSKELETAL: No joint effusion 


EXTREMITIES: No cyanosis, clubbing or edema. LUE AVF with thrill. 


PSYCH: Cooperative.





- Constitutional


Vitals: 


                                        











Temp Pulse Resp BP Pulse Ox


 


 98.1 F   103 H  18   126/68   99 


 


 09/28/22 14:00  09/28/22 14:00  09/28/22 14:00  09/28/22 14:00  09/28/22 14:00














HEART Score





- HEART Score


EKG: Normal


Age: < 45


Risk factors: 1-2 risk factors


Troponin: 


                                        











Troponin T  0.166 ng/mL (0.00-0.029)  H* D 09/27/22  09:44    











Troponin: 1-3x normal limit





- Critical Actions


Critical Actions: 0-3 pts:0.9-1.7%risk of adverse cardiac event.Candidate for 

discharge





Results





- Labs


CBC & Chem 7: 


                                 09/27/22 05:27





                                 09/27/22 09:44


Labs: 


                             Laboratory Last Values











WBC  18.3 K/mm3 (4.5-11.0)  H  09/27/22  05:27    


 


RBC  3.08 M/mm3 (3.65-5.03)  L  09/27/22  05:27    


 


Hgb  9.3 gm/dl (10.1-14.3)  L  09/27/22  05:27    


 


Hct  29.7 % (30.3-42.9)  L  09/27/22  05:27    


 


MCV  97 fl (79-97)   09/27/22  05:27    


 


MCH  30 pg (28-32)   09/27/22  05:27    


 


MCHC  31 % (30-34)   09/27/22  05:27    


 


RDW  20.5 % (13.2-15.2)  H  09/27/22  05:27    


 


Plt Count  322 K/mm3 (140-440)   09/27/22  05:27    


 


Lymph % (Auto)  3.7 % (13.4-35.0)  L  09/26/22  08:41    


 


Mono % (Auto)  6.0 % (0.0-7.3)   09/26/22  08:41    


 


Eos % (Auto)  0.1 % (0.0-4.3)   09/26/22  08:41    


 


Baso % (Auto)  1.4 % (0.0-1.8)   09/26/22  08:41    


 


Lymph # (Auto)  0.5 K/mm3 (1.2-5.4)  L  09/26/22  08:41    


 


Mono # (Auto)  0.8 K/mm3 (0.0-0.8)   09/26/22  08:41    


 


Eos # (Auto)  0.0 K/mm3 (0.0-0.4)   09/26/22  08:41    


 


Baso # (Auto)  0.2 K/mm3 (0.0-0.1)  H  09/26/22  08:41    


 


Add Manual Diff  Complete   09/27/22  05:27    


 


Total Counted  100   09/27/22  05:27    


 


Seg Neutrophils %  Np   09/27/22  05:27    


 


Seg Neuts % (Manual)  91.0 % (40.0-70.0)  H  09/27/22  05:27    


 


Band Neutrophils %  0 %  09/27/22  05:27    


 


Lymphocytes % (Manual)  4.0 % (13.4-35.0)  L  09/27/22  05:27    


 


Reactive Lymphs % (Man)  0 %  09/27/22  05:27    


 


Monocytes % (Manual)  5.0 % (0.0-7.3)   09/27/22  05:27    


 


Eosinophils % (Manual)  0 % (0.0-4.3)   09/27/22  05:27    


 


Basophils % (Manual)  0 % (0.0-1.8)   09/27/22  05:27    


 


Metamyelocytes %  0 %  09/27/22  05:27    


 


Myelocytes %  0 %  09/27/22  05:27    


 


Promyelocytes %  0 %  09/27/22  05:27    


 


Blast Cells %  0 %  09/27/22  05:27    


 


Nucleated RBC %  Not Reportable   09/27/22  05:27    


 


Seg Neutrophils #  11.7 K/mm3 (1.8-7.7)  H  09/26/22  08:41    


 


Seg Neutrophils # Man  16.7 K/mm3 (1.8-7.7)  H  09/27/22  05:27    


 


Band Neutrophils #  0.0 K/mm3  09/27/22  05:27    


 


Lymphocytes # (Manual)  0.7 K/mm3 (1.2-5.4)  L  09/27/22  05:27    


 


Abs React Lymphs (Man)  0.0 K/mm3  09/27/22  05:27    


 


Monocytes # (Manual)  0.9 K/mm3 (0.0-0.8)  H  09/27/22  05:27    


 


Eosinophils # (Manual)  0.0 K/mm3 (0.0-0.4)   09/27/22  05:27    


 


Basophils # (Manual)  0.0 K/mm3 (0.0-0.1)   09/27/22  05:27    


 


Metamyelocytes #  0.0 K/mm3  09/27/22  05:27    


 


Myelocytes #  0.0 K/mm3  09/27/22  05:27    


 


Promyelocytes #  0.0 K/mm3  09/27/22  05:27    


 


Blast Cells #  0.0 K/mm3  09/27/22  05:27    


 


WBC Morphology  Not Reportable   09/27/22  05:27    


 


Hypersegmented Neuts  Not Reportable   09/27/22  05:27    


 


Hyposegmented Neuts  Not Reportable   09/27/22  05:27    


 


Hypogranular Neuts  Not Reportable   09/27/22  05:27    


 


Smudge Cells  Not Reportable   09/27/22  05:27    


 


Toxic Granulation  Not Reportable   09/27/22  05:27    


 


Toxic Vacuolation  Not Reportable   09/27/22  05:27    


 


Dohle Bodies  Not Reportable   09/27/22  05:27    


 


Pelger-Huet Anomaly  Not Reportable   09/27/22  05:27    


 


Winifred Rods  Not Reportable   09/27/22  05:27    


 


Platelet Estimate  Consistent w auto   09/27/22  05:27    


 


Clumped Platelets  Not Reportable   09/27/22  05:27    


 


Plt Clumps, EDTA  Not Reportable   09/27/22  05:27    


 


Large Platelets  Not Reportable   09/27/22  05:27    


 


Giant Platelets  Not Reportable   09/27/22  05:27    


 


Platelet Satelliting  Not Reportable   09/27/22  05:27    


 


Plt Morphology Comment  Not Reportable   09/27/22  05:27    


 


RBC Morphology  Not Reportable   09/27/22  05:27    


 


Dimorphic RBCs  Not Reportable   09/27/22  05:27    


 


Polychromasia  Not Reportable   09/27/22  05:27    


 


Hypochromasia  Not Reportable   09/27/22  05:27    


 


Poikilocytosis  Not Reportable   09/27/22  05:27    


 


Anisocytosis  1+   09/27/22  05:27    


 


Microcytosis  Not Reportable   09/27/22  05:27    


 


Macrocytosis  Few   09/27/22  05:27    


 


Spherocytes  Not Reportable   09/27/22  05:27    


 


Pappenheimer Bodies  Not Reportable   09/27/22  05:27    


 


Sickle Cells  Not Reportable   09/27/22  05:27    


 


Target Cells  Not Reportable   09/27/22  05:27    


 


Tear Drop Cells  Not Reportable   09/27/22  05:27    


 


Ovalocytes  Not Reportable   09/27/22  05:27    


 


Helmet Cells  Not Reportable   09/27/22  05:27    


 


[place] Bodies  Not Reportable   09/27/22  05:27    


 


Cabot Rings  Not Reportable   09/27/22  05:27    


 


Radha Cells  Not Reportable   09/27/22  05:27    


 


Bite Cells  Not Reportable   09/27/22  05:27    


 


Crenated Cell  Not Reportable   09/27/22  05:27    


 


Elliptocytes  Not Reportable   09/27/22  05:27    


 


Acanthocytes (Spur)  Not Reportable   09/27/22  05:27    


 


Rouleaux  Not Reportable   09/27/22  05:27    


 


Hemoglobin C Crystals  Not Reportable   09/27/22  05:27    


 


Schistocytes  Few   09/27/22  05:27    


 


Malaria parasites  Not Reportable   09/27/22  05:27    


 


Manuel Bodies  Not Reportable   09/27/22  05:27    


 


Hem Pathologist Commnt  No   09/27/22  05:27    


 


PT  14.0 Sec. (12.2-14.9)   09/26/22  08:41    


 


INR  0.95  (0.87-1.13)   09/26/22  08:41    


 


Sodium  134 mmol/L (137-145)  L  09/27/22  05:27    


 


Potassium  5.3 mmol/L (3.6-5.0)  H D 09/27/22  09:44    


 


Chloride  91.4 mmol/L ()  L  09/27/22  05:27    


 


Carbon Dioxide  19 mmol/L (22-30)  L  09/27/22  05:27    


 


Anion Gap  31 mmol/L  09/27/22  05:27    


 


BUN  68 mg/dL (7-17)  H  09/27/22  05:27    


 


Creatinine  10.4 mg/dL (0.6-1.2)  H  09/27/22  05:27    


 


Estimated GFR  5 ml/min  09/27/22  05:27    


 


BUN/Creatinine Ratio  7 %  09/27/22  05:27    


 


Glucose  202 mg/dL ()  H  09/27/22  05:27    


 


POC Glucose  172 mg/dL ()  H  09/28/22  08:10    


 


Hemoglobin A1c  5.6 % (4-6)   09/27/22  05:27    


 


Ketones Quantitative  Negative  (Negative)   09/26/22  08:41    


 


Calcium  9.1 mg/dL (8.4-10.2)   09/27/22  05:27    


 


Magnesium  2.10 mg/dL (1.7-2.3)   09/26/22  08:41    


 


Total Bilirubin  0.50 mg/dL (0.1-1.2)   09/27/22  05:27    


 


AST  15 units/L (5-40)   09/27/22  05:27    


 


ALT  15 units/L (7-56)   09/27/22  05:27    


 


Alkaline Phosphatase  186 units/L ()  H  09/27/22  05:27    


 


Total Creatine Kinase  73 units/L ()   09/26/22  08:41    


 


CK-MB (CK-2)  2.5 ng/mL (0.0-4.0)   09/26/22  08:41    


 


CK-MB (CK-2) Rel Index  3.4  (0-4)   09/26/22  08:41    


 


Troponin T  0.166 ng/mL (0.00-0.029)  H* D 09/27/22  09:44    


 


NT-Pro-B Natriuret Pep  33962 pg/mL (0-450)  H  09/26/22  08:41    


 


Total Protein  6.9 g/dL (6.3-8.2)   09/27/22  05:27    


 


Albumin  4.0 g/dL (3.9-5)   09/27/22  05:27    


 


Albumin/Globulin Ratio  1.4 %  09/27/22  05:27    


 


Triglycerides  70 mg/dL (2-149)   09/26/22  08:41    


 


Cholesterol  143 mg/dL ()   09/26/22  08:41    


 


LDL Cholesterol Direct  67 mg/dL ()   09/26/22  08:41    


 


HDL Cholesterol  64 mg/dL (40-59)  H  09/26/22  08:41    


 


Cholesterol/HDL Ratio  2.23 %  09/26/22  08:41    


 


Lipase  19 units/L (13-60)   09/26/22  08:41    


 


Hepatitis A IgM Ab  Non-reactive  (NonReactive)   09/27/22  09:44    


 


Hep Bs Antigen  Non-reactive  (Negative)   09/27/22  09:44    


 


Hep B Core IgM Ab  Non-reactive  (NonReactive)   09/27/22  09:44    


 


Hepatitis C Antibody  Non-reactive  (NonReactive)   09/27/22  09:44    











Harvey/IV: 


                                        





Voiding Method                   Toilet











Active Medications





- Current Medications


Current Medications: 














Generic Name Dose Route Start Last Admin





  Trade Name Freq  PRN Reason Stop Dose Admin


 


Acetaminophen  650 mg  09/26/22 14:21  09/26/22 21:51





  Acetaminophen 325 Mg Tab  PO   650 mg





  Q4H PRN   Administration





  Pain MILD(1-3)/Fever >100.5/HA  


 


Aspirin  81 mg  09/28/22 15:00 





  Aspirin Ec 81 Mg Tab  PO  





  QDAY SANJUANA  


 


Carvedilol  6.25 mg  09/27/22 12:00  09/27/22 22:03





  Carvedilol 6.25 Mg Tab  PO   6.25 mg





  BID SANJUANA   Administration


 


Epoetin Melo-epbx  10,000 unit  09/27/22 08:00  09/28/22 14:08





  Epoetin Melo-Epbx 10,000 Unit/1 Ml Vial  IV   10,000 unit





  CHEPE PRN   Administration





  hemodialysis  


 


Heparin Sodium (Porcine)  5,000 unit  09/26/22 14:30  09/27/22 23:01





  Heparin 5,000 Unit/1 Ml Vial  SUB-Q   Not Given





  Q12HR SANJUANA  


 


Hydromorphone HCl  0.5 mg  09/26/22 14:21 





  Hydromorphone 0.5 Mg/0.5 Ml Inj  IV  





  Q3H PRN  





  Pain , Severe (7-10)  


 


Ceftriaxone Sodium  2 gm in 100 mls @ 200 mls/hr  09/27/22 22:00  09/27/22 22:32





  Rocephin/Ns 2 Gm/100 Ml  IV   200 mls/hr





  Q24H SANJUANA   Administration





  Protocol  


 


Azithromycin  500 mg in 250 mls @ 250 mls/hr  09/27/22 10:00  09/27/22 15:25





  Zithromax/Ns  IV   250 mls/hr





  Q24H [facility]   Administration


 


Sodium Chloride  100 mls @ 999 mls/hr  09/28/22 12:00 





  Nacl 0.9%  IV  





  CHEPE PRN  





  Hypotension  


 


Isosorbide Mononitrate  60 mg  09/27/22 12:00  09/27/22 15:28





  Isosorbide Mononitrate Er 60 Mg Tab  PO   60 mg





  QDAY SANJUANA   Administration


 


Morphine Sulfate  2 mg  09/26/22 14:21  09/27/22 23:25





  Morphine 2 Mg/1 Ml Inj  IV   2 mg





  Q4H PRN   Administration





  Pain, Moderate (4-6)  


 


Nitroglycerin  0.4 mg  09/26/22 23:49  09/27/22 00:35





  Nitroglycerin 0.4 Mg Tab Subl  SL   0.4 mg





  .Q5MIN PRN   Administration





  Chest Pain  


 


Ondansetron HCl  4 mg  09/26/22 14:21  09/27/22 23:22





  Ondansetron 4 Mg/2 Ml Inj  IV   4 mg





  Q8H PRN   Administration





  Nausea And Vomiting  


 


Pantoprazole Sodium  40 mg  09/27/22 07:30  09/28/22 08:00





  Pantoprazole 40 Mg Tab  PO   40 mg





  QDAC SANJUANA   Administration


 


Prasugrel  10 mg  09/28/22 15:00 





  Prasugrel 10 Mg Tab  PO  





  QDAY SANJUANA  


 


Sodium Chloride  10 ml  09/26/22 15:00  09/27/22 22:04





  Sodium Chloride 0.9% 10 Ml Flush Syringe  IV   10 ml





  BID SANJUANA   Administration


 


Sodium Chloride  10 ml  09/26/22 14:21 





  Sodium Chloride 0.9% 10 Ml Flush Syringe  IV  





  PRN PRN  





  LINE FLUSH

## 2022-09-28 NOTE — PROGRESS NOTE
Assessment and Plan





- Patient Problems


(1) Right lower lobe pneumonia


Current Visit: Yes   Status: Acute   


Plan to address problem: 


The patient presents with shortness of breath and right lower lobe consolidation

on the chest x-ray, will defer to the medicine and pulmonary services for 

further management of acute pneumonia.  No clinical or x-ray evidence of heart 

failure as a cause of her cough or shortness of breath.  





Echocardiogram shows normal left ventricular systolic function with ejection 

fraction 55-60%.








(2) Coronary artery disease


Current Visit: Yes   Status: Acute   


Plan to address problem: 


Patient has coronary artery disease, multiple coronary stents most recently 6 

months ago, no cardiac complaints at the current time.  In addition to optimal 

guideline directed medical therapy, we have resumed the patient's aspirin and 

Effient dual antiplatelet therapy.








Subjective


Date of service: 09/28/22


Principal diagnosis: Shortness of breath, right lower lobe consolidation


Interval history: 





Patient is comfortable, currently on dialysis, no new cardiac complaints.  

Echocardiogram done today reveals well-preserved left ventricular systolic 

function with ejection fraction 55 to 60%.





Objective


                                   Vital Signs











  Temp Pulse Resp BP BP Pulse Ox Pulse Ox


 


 09/28/22 13:00   99 H   135/68   


 


 09/28/22 12:45   98 H   133/60   


 


 09/28/22 12:30   98 H   144/69   


 


 09/28/22 12:15   97 H   130/66   


 


 09/28/22 12:00   96 H   131/61   


 


 09/28/22 11:45   96 H   144/72   


 


 09/28/22 11:30   95 H   116/64   


 


 09/28/22 11:15   96 H   145/74   


 


 09/28/22 11:00   96 H   140/71   


 


 09/28/22 10:55   93 H   133/70   


 


 09/28/22 10:48   92 H   125/67   


 


 09/28/22 10:33  98.1 F  94 H  18  127/66    100


 


 09/28/22 08:37  97.2 F L  92 H   142/54   100 


 


 09/28/22 04:20  97.5 F L  89  18  99/55   97 


 


 09/28/22 03:26       100 


 


 09/28/22 01:00   115 H     


 


 09/28/22 00:00  99.4 F  100 H  18  107/46   100 


 


 09/27/22 22:03   109 H   108/55   


 


 09/27/22 19:00  99.4 F  113 H  18   110/49  97 


 


 09/27/22 17:00   108 H     98 


 


 09/27/22 15:29   120 H     


 


 09/27/22 15:28   120 H     


 


 09/27/22 15:12  98.9 F  119 H  18  150/64   98 














- Physical Examination


General: Appears Well, No Apparent Distress


HEENT: Positive: PERRL


Neck: Positive: neck supple


Cardiac: Positive: Reg Rate and Rhythm


Lungs: Positive: Decreased Breath Sounds


Neuro: Positive: Grossly Intact


Abdomen: Positive: Soft


Skin: Positive: Clear


Extremities: Absent: edema

## 2022-09-29 VITALS — DIASTOLIC BLOOD PRESSURE: 48 MMHG | SYSTOLIC BLOOD PRESSURE: 108 MMHG

## 2022-09-29 RX ADMIN — AZITHROMYCIN SCH MLS/HR: 500 INJECTION, POWDER, LYOPHILIZED, FOR SOLUTION INTRAVENOUS at 16:41

## 2022-09-29 RX ADMIN — PRASUGREL SCH MG: 10 TABLET, FILM COATED ORAL at 12:03

## 2022-09-29 RX ADMIN — ASPIRIN SCH MG: 81 TABLET, COATED ORAL at 12:03

## 2022-09-29 RX ADMIN — PANTOPRAZOLE SODIUM SCH MG: 40 TABLET, DELAYED RELEASE ORAL at 12:03

## 2022-09-29 RX ADMIN — INSULIN LISPRO SCH: 100 INJECTION, SOLUTION INTRAVENOUS; SUBCUTANEOUS at 07:30

## 2022-09-29 RX ADMIN — INSULIN LISPRO SCH UNIT: 100 INJECTION, SOLUTION INTRAVENOUS; SUBCUTANEOUS at 12:00

## 2022-09-29 RX ADMIN — CALCIUM ACETATE SCH MG: 667 CAPSULE ORAL at 16:40

## 2022-09-29 RX ADMIN — CALCIUM ACETATE SCH MG: 667 CAPSULE ORAL at 12:02

## 2022-09-29 RX ADMIN — Medication SCH ML: at 16:43

## 2022-09-29 RX ADMIN — INSULIN LISPRO SCH UNIT: 100 INJECTION, SOLUTION INTRAVENOUS; SUBCUTANEOUS at 16:40

## 2022-09-29 NOTE — PROGRESS NOTE
Assessment and Plan





- Patient Problems


(1) Right lower lobe pneumonia


Current Visit: Yes   Status: Acute   


Plan to address problem: 


The patient presents with shortness of breath and right lower lobe consolidation

on the chest x-ray, will defer to the medicine and pulmonary services for 

further management of acute pneumonia.  No clinical or x-ray evidence of heart 

failure as a cause of her cough or shortness of breath.  





Echocardiogram shows normal left ventricular systolic function with ejection 

fraction 55-60%.








(2) Coronary artery disease


Current Visit: Yes   Status: Acute   


Plan to address problem: 


Patient has coronary artery disease, multiple coronary stents most recently 6 

months ago, no cardiac complaints at the current time.  In addition to optimal 

guideline directed medical therapy, we have resumed the patient's aspirin and 

Effient dual antiplatelet therapy.








Subjective


Date of service: 09/29/22


Principal diagnosis: Shortness of breath, right lower lobe consolidation


Interval history: 





Patient is comfortable, currently on dialysis, no new cardiac complaints.





Objective


                                   Vital Signs











  Temp Pulse Resp BP Pulse Ox


 


 09/29/22 15:37      94


 


 09/29/22 10:00    18   98


 


 09/29/22 07:37  97.8 F  94 H   129/67  98


 


 09/29/22 04:16  98.0 F  89  16  86/44  100


 


 09/29/22 01:09      93


 


 09/28/22 23:36  98.0 F  99 H  16  102/50  98


 


 09/28/22 20:06     144/81 


 


 09/28/22 20:00   107 H   


 


 09/28/22 19:50  98.7 F  105 H  16  97/42  93














- Physical Examination


General: Appears Well, No Apparent Distress


HEENT: Positive: PERRL


Neck: Positive: neck supple


Cardiac: Positive: Reg Rate and Rhythm


Lungs: Positive: Decreased Breath Sounds


Neuro: Positive: Grossly Intact


Abdomen: Positive: Soft


Skin: Positive: Clear


Extremities: Absent: edema

## 2022-09-29 NOTE — DISCHARGE SUMMARY
Providers





- Providers


Date of Admission: 


09/26/22 14:21





Date of discharge: 09/29/22


Attending physician: 


MAYE LLAMAS MD





                                        





09/27/22 07:19


Consult to Physician [CONS] Routine 


   Comment: 


   Consulting Provider: YOUSIF SHIPLEY


   Physician Instructions: 


   Reason For Exam: ESRD





09/27/22 11:43


Consult to Physician [CONS] Routine 


   Comment: 


   Consulting Provider: QUINTON HELTON


   Physician Instructions: 


   Reason For Exam: CHEST PAIN/ELEVATED TROPONIN











Primary care physician: 


PRIMARY CARE MD








Hospitalization


Reason for admission: Chest pain, pneumonia


Condition: Stable


Hospital course: 





Patient is a 42-year-old female with history of ESRD presented with shortness of

 breath and chest pain.  She was found to have right lower lobe pneumonia.  

Echocardiogram revealed ejection fraction of 55 to 60% and mild pulmonary 

hypertension.  Patient was on 4 L of oxygen and weaned to room air.  Once 

stable, patient was discharged home.


Disposition: 01 HOME / SELF CARE / HOMELESS


Final Discharge Diagnosis (Prints w/discharge instructions): Acute hypoxic 

respiratory failure.  Right lower lobe pneumonia secondary to gram-negative 

versus atypical bacteria.  NSTEMI type II.  ESRD requiring dialysis.  

Hyperkalemia.  Coronary artery disease.  Hypertension


Time spent for discharge: 45 minutes





Core Measure Documentation





- Palliative Care


Palliative Care/ Comfort Measures: Not Applicable





- Core Measures


Any of the following diagnoses?: none





Exam





- Physical Exam


Narrative exam: 





GENERAL: Well-developed well-nourished. In no acute distress.


HEENT: Normocephalic, atraumatic.


NECK: Supple.  


CHEST/LUNGS: Decreased breath sound in R lower lung field. Chest nonTTP


HEART/CARDIOVASCULAR: RRR. No murmur, rubs or gallops appreciated.


ABDOMEN: +BS. NT/ND.


SKIN: No rashes noted.


NEURO:  No focal motor deficit.  Follows all commands and is ambulatory.


MUSCULOSKELETAL: No joint effusion 


EXTREMITIES: No cyanosis, clubbing or edema. LUE AVF with thrill. 


PSYCH: Cooperative.





- Constitutional


Vitals: 


                                        











Temp Pulse Resp BP Pulse Ox


 


 97.8 F   94 H  16   129/67   98 


 


 09/29/22 07:37  09/29/22 07:37  09/29/22 04:16  09/29/22 07:37  09/29/22 07:37














Plan


Care Plan Goals: 


Please take all antibiotics until they are complete.  We have given you 

prescription for fluconazole a request.


Continue to take all your home medications and continue to go to dialysis on 

Mondays, Wednesdays and Fridays.


Follow up with: 


PRIMARY CARE,MD [Primary Care Provider] - 3-5 Days


Prescriptions: 


Azithromycin 500 mg PO QDAY 1 Days #1 tab


Fluconazole 150 mg PO QDAY 1 Days #1 tab


Amoxicillin [Trimox CAP] 500 mg PO Q12H 3 Days #6 capsule

## 2022-09-30 NOTE — ELECTROCARDIOGRAPH REPORT
[facility]

                                       

Test Date:    2022               Test Time:    10:37:24

Pat Name:     WILLIAM MEJIA            Department:   

Patient ID:   [id number]          Room:         A491 1

Gender:       F                        Technician:   SC

:          1980               Requested By: REYNOLD HOSKINS

Order Number: [id number]             Reading MD:   Steve Powell

                                 Measurements

Intervals                              Axis          

Rate:         102                      P:            42

KY:           174                      QRS:          -27

QRSD:         75                       T:            91

QT:           362                                    

QTc:          470                                    

                           Interpretive Statements

Sinus tachycardia

No previous ECG available for comparison

Electronically Signed On 2022 9:33:04 PDT by Steve Powell